# Patient Record
(demographics unavailable — no encounter records)

---

## 2025-02-26 NOTE — PHYSICAL EXAM
[Non-Tender] : non-tender [Smooth] : smooth [General Appearance - Alert] : alert [Neck Appearance] : the appearance of the neck was normal [Heart Sounds] : normal S1 and S2 [Breast Appearance] : normal in appearance [Bowel Sounds] : normal bowel sounds [Abdomen Soft] : soft [Abnormal Walk] : normal gait [Skin Turgor] : normal skin turgor [Oriented To Time, Place, And Person] : oriented to person, place, and time [Impaired Insight] : insight and judgment were intact [Affect] : the affect was normal [Scleral Icterus] : No Scleral Icterus [Spider Angioma] : No spider angioma(s) were observed [Abdominal  Ascites] : no ascites [Asterixis] : no asterixis observed [Jaundice] : No jaundice [Palmar Erythema] : no Palmar Erythema [Depression] : no depression

## 2025-02-26 NOTE — HISTORY OF PRESENT ILLNESS
[FreeTextEntry1] : PCP/Hematology Dr. Jeffery Ramesh  Vernon Barbosa is a 66 y/o male with HBeAg-negative chronic hepatitis B (Hep B genotype C) on Vemlidy (since ). Here for follow up. He had a liver bx in  and was told okay. He has four first-degree family history of HCC. Tolerating well on Vemlidy. - Pt and wife state that he had an ER visit w/ [facility] in  ~2024 d/t PNA (was never admitted/ intubated) & was also noted to have markedly elevated liver enzymes, was being followed by PCP Dr. Gil for this.   Fibroscan 25: 304 CAP 6.5kPa S2-3 F0-1 Today he reports feeling well and no physical complaints.   Allergy: NKDA Medical Hx: asa tere Surgical Hx: None Smoking: no ETOH: no illicit drug: no Supplements: He takes about 10 vitamin supplements such as vitamin B, C, Omega, lutein, Iron etc. Family Hx: father-  from HCC at 88. Brother- from Hep B/ HCC. Mother-HCC s/p LRT. Sister had liver transplant due to HCC in   [Labs] 25 AFP <1.8 CBC wnl  CMP wnl ALT 23 HBV DNA 17 IU/mL  22 labs @ [facility] ordered by Dr. Cory Guillen Hep D Negative  22 Hep B genotype C Hep B DNA detcted 1300 IU/mL (previously 1000 in 2022)  [Imaging] US abd 2025: Liver: Within normal limits. Bile ducts: Normal caliber. Common bile duct measures 3 mm. Gallbladder: No gallstones. Stable 0.2 cm polyp. Pancreas: Visualized portions are within normal limits. Spleen: 8.4 cm. Within normal limits. Right kidney: 10.8 cm. No hydronephrosis. Left kidney: 10.2 cm. No hydronephrosis. Ascites: None. Aorta and IVC: Visualized portions are within normal limits. Fibroscan 23 F0-1 S0 MRI/MRCP 10/18/22: LIVER: No evidence of cirrhosis or significant steatosis. 5 mm hemangioma in the right lobe. No evidence of hepatoma. Other organs are unremarkable.  -US abd 2021: normal contour and echogenicity. stable 1.1cm hemangioma within the right hepatic lobe. no lesion stable 3mm gallbladder polyp -Fibroscan 2019: S2-3, F0-1 -Dexa scan 2017: normal -MRI abd 2017: no cirrhosis, 8mm cyst within hepatic segment 7.  [Procedures] EGD 2017: normal per pt colonoscopy 2015: normal per pt

## 2025-02-26 NOTE — REVIEW OF SYSTEMS
[Negative] : Heme/Lymph [Change In A Mole] : no change in a mole [An Unusual Growth] : no unusual growth on the skin

## 2025-02-26 NOTE — ASSESSMENT
[FreeTextEntry1] :  [Assessment]   68 y/o male with HBeAg-negative chronic hepatitis B (Hep B genotype C) on Vemlidy (since 2017). He has four first-degree family history of HCC.  [Plan]  # Chronic hepatitis B - Continue Vemlidy daily, last refill done 2/28/24 - Emphasized the importance of compliance with medications and HCC screening - Fibroscan 1/31/23 F0-1 S0, will obtain at time of next visit in 6 mo. - Pt will reach out to PCP's office and request for b/w to be faxed over to us.  # Stable 0.2 cm polyp. asymptomatic continue to monitor the size/stone when we have HCC screening  #Health Maintenance Immune to Hep A Hep C Negative Hep D negative (From labs 2022)  Labs 8/2025 US abd to screen HCC Fibroscan 2/25/25: 304 CAP 6.5kPa S2-3 F0-1 RTO 8/2025

## 2025-02-26 NOTE — HISTORY OF PRESENT ILLNESS
[FreeTextEntry1] : PCP/Hematology Dr. Jeffery Ramesh  Vernon Barbosa is a 68 y/o male with HBeAg-negative chronic hepatitis B (Hep B genotype C) on Vemlidy (since ). Here for follow up. He had a liver bx in  and was told okay. He has four first-degree family history of HCC. Tolerating well on Vemlidy. - Pt and wife state that he had an ER visit w/ [facility] in  ~2024 d/t PNA (was never admitted/ intubated) & was also noted to have markedly elevated liver enzymes, was being followed by PCP Dr. Gil for this.   Fibroscan 25: 304 CAP 6.5kPa S2-3 F0-1 Today he reports feeling well and no physical complaints.   Allergy: NKDA Medical Hx: asa tere Surgical Hx: None Smoking: no ETOH: no illicit drug: no Supplements: He takes about 10 vitamin supplements such as vitamin B, C, Omega, lutein, Iron etc. Family Hx: father-  from HCC at 88. Brother- from Hep B/ HCC. Mother-HCC s/p LRT. Sister had liver transplant due to HCC in   [Labs] 25 AFP <1.8 CBC wnl  CMP wnl ALT 23 HBV DNA 17 IU/mL  22 labs @ [facility] ordered by Dr. Cory Guillen Hep D Negative  22 Hep B genotype C Hep B DNA detcted 1300 IU/mL (previously 1000 in 2022)  [Imaging] US abd 2025: Liver: Within normal limits. Bile ducts: Normal caliber. Common bile duct measures 3 mm. Gallbladder: No gallstones. Stable 0.2 cm polyp. Pancreas: Visualized portions are within normal limits. Spleen: 8.4 cm. Within normal limits. Right kidney: 10.8 cm. No hydronephrosis. Left kidney: 10.2 cm. No hydronephrosis. Ascites: None. Aorta and IVC: Visualized portions are within normal limits. Fibroscan 23 F0-1 S0 MRI/MRCP 10/18/22: LIVER: No evidence of cirrhosis or significant steatosis. 5 mm hemangioma in the right lobe. No evidence of hepatoma. Other organs are unremarkable.  -US abd 2021: normal contour and echogenicity. stable 1.1cm hemangioma within the right hepatic lobe. no lesion stable 3mm gallbladder polyp -Fibroscan 2019: S2-3, F0-1 -Dexa scan 2017: normal -MRI abd 2017: no cirrhosis, 8mm cyst within hepatic segment 7.  [Procedures] EGD 2017: normal per pt colonoscopy 2015: normal per pt